# Patient Record
(demographics unavailable — no encounter records)

---

## 2025-07-25 NOTE — PHYSICAL EXAM
[Pedal Pulses Present] : the pedal pulses are present [No Edema] : there was no peripheral edema [Normal] : no posterior cervical lymphadenopathy and no anterior cervical lymphadenopathy [de-identified] : No palpable mass

## 2025-07-25 NOTE — ASSESSMENT
[FreeTextEntry1] : Well male check labs subchondral discomfort has been unchanged for 6 years [Vaccines Reviewed] : Immunizations reviewed today. Please see immunization details in the vaccine log within the immunization flowsheet.

## 2025-07-25 NOTE — HEALTH RISK ASSESSMENT
[Excellent] : ~his/her~  mood as  excellent [Yes] : Yes [No falls in past year] : Patient reported no falls in the past year [PHQ-2 Negative - No further assessment needed] : PHQ-2 Negative - No further assessment needed [de-identified] : Several beers per week [Never] : Never [Change in mental status noted] : No change in mental status noted [With Family] : lives with family [] :  [# Of Children ___] : has [unfilled] children [Fully functional (bathing, dressing, toileting, transferring, walking, feeding)] : Fully functional (bathing, dressing, toileting, transferring, walking, feeding) [Fully functional (using the telephone, shopping, preparing meals, housekeeping, doing laundry, using] : Fully functional and needs no help or supervision to perform IADLs (using the telephone, shopping, preparing meals, housekeeping, doing laundry, using transportation, managing medications and managing finances) [Reports changes in hearing] : Reports no changes in hearing [Seat Belt] :  uses seat belt

## 2025-07-25 NOTE — HISTORY OF PRESENT ILLNESS
[FreeTextEntry1] : Yearly physical [de-identified] : Only complaint is chronic slightly left of center subchondral discomfort when he sits for a long period of time disappears upon standing was first noted in 2019 and has not changed.  No association with exertion bowel movements normal  He is active but does not exercise.  No cardiovascular symptoms